# Patient Record
Sex: FEMALE | Race: BLACK OR AFRICAN AMERICAN | NOT HISPANIC OR LATINO | Employment: FULL TIME | URBAN - METROPOLITAN AREA
[De-identification: names, ages, dates, MRNs, and addresses within clinical notes are randomized per-mention and may not be internally consistent; named-entity substitution may affect disease eponyms.]

---

## 2017-03-30 ENCOUNTER — NON-PROVIDER VISIT (OUTPATIENT)
Dept: OCCUPATIONAL MEDICINE | Facility: CLINIC | Age: 37
End: 2017-03-30

## 2017-03-30 DIAGNOSIS — Z02.1 PRE-EMPLOYMENT DRUG SCREENING: ICD-10-CM

## 2017-03-30 LAB
AMP AMPHETAMINE: NORMAL
COC COCAINE: NORMAL
INT CON NEG: NORMAL
INT CON POS: NORMAL
MET METHAMPHETAMINES: NORMAL
OPI OPIATES: NORMAL
PCP PHENCYCLIDINE: NORMAL
POC DRUG COMMENT 753798-OCCUPATIONAL HEALTH: NORMAL
THC: NORMAL

## 2017-03-30 PROCEDURE — 80305 DRUG TEST PRSMV DIR OPT OBS: CPT | Performed by: PREVENTIVE MEDICINE

## 2017-03-30 NOTE — MR AVS SNAPSHOT
Elizabeth Beck   3/30/2017 1:00 PM   Non-Provider Visit   MRN: [id number]    Department:  [facility]   Dept Phone:  [phone]    Description:  Female : 1980   Provider:  [facility] WILLEM MOLINA, R.N.           Reason for Visit     Drug / Alcohol Assessment d/s pre employment [employer]       Allergies as of 3/30/2017     Allergen Noted Reactions    Nkda [No Known Drug Allergy] 2009         You were diagnosed with     Pre-employment drug screening   [841356]         Vital Signs     Smoking Status                   Current Every Day Smoker           Basic Information     Date Of Birth Sex Race Ethnicity Preferred Language    1980 Female Black or  Non- English      Your appointments     Mar 30, 2017  1:00 PM   Occupational Health Drug and Alcohol Testing with [place] WILLEM MCCLAIN MA   [facility] Occupational Health [facility]    [address] [place] [address]  [address]  [facility] [address]   [phone]              Health Maintenance     Patient has no pending health maintenance at this time      Results     POCT 6 Panel Urine Drug Screen      Component    AMPHETAMINE    POC THC    COCAINE    OPIATES    PHENCYCLIDINE    METHAMPHETAMINES    POC Urine Drug Screen Comment    NEG    Internal Control Positive    Valid    Internal Control Negative    Valid                        Current Immunizations     Tdap Vaccine 2016  5:38 AM      Below and/or attached are the medications your provider expects you to take. Review all of your home medications and newly ordered medications with your provider and/or pharmacist. Follow medication instructions as directed by your provider and/or pharmacist. Please keep your medication list with you and share with your provider. Update the information when medications are discontinued, doses are changed, or new medications (including over-the-counter products) are added; and carry medication information at all times in the event of emergency  situations     Allergies:  NKDA - (reactions not documented)               Medications  Valid as of: March 30, 2017 - 12:42 PM    Generic Name Brand Name Tablet Size Instructions for use    Hydrocodone-Acetaminophen (Tab) NORCO 5-325 MG Take 1-2 Tabs by mouth every 6 hours as needed.        .                 Medicines prescribed today were sent to:     None      Medication refill instructions:       If your prescription bottle indicates you have medication refills left, it is not necessary to call your provider’s office. Please contact your pharmacy and they will refill your medication.    If your prescription bottle indicates you do not have any refills left, you may request refills at any time through one of the following ways: The online [facility] system (except Urgent Care), by calling your provider’s office, or by asking your pharmacy to contact your provider’s office with a refill request. Medication refills are processed only during regular business hours and may not be available until the next business day. Your provider may request additional information or to have a follow-up visit with you prior to refilling your medication.   *Please Note: Medication refills are assigned a new Rx number when refilled electronically. Your pharmacy may indicate that no refills were authorized even though a new prescription for the same medication is available at the pharmacy. Please request the medicine by name with the pharmacy before contacting your provider for a refill.           MyChart Status: Patient Declined        Quit Tobacco Information     Do you want to quit using tobacco?    Quitting tobacco decreases risks of cancer, heart and lung disease, increases life expectancy, improves sense of taste and smell, and increases spending money, among other benefits.    If you are thinking about quitting, we can help.  • [facility] Quit Tobacco Program: 924.416.6351  o Program occurs weekly for four weeks and includes pharmacist  consultation on products to support quitting smoking or chewing tobacco. A provider referral is needed for pharmacist consultation.  • Tobacco Users Help Hotline: 2-800QUIT-NOW (909-9231) or https://[place].quitlogix.org/  o Free, confidential telephone and online coaching for [place] residents. Sessions are designed on a schedule that is convenient for you. Eligible clients receive free nicotine replacement therapy.  • Nationally: www.smokefree.gov  o Information and professional assistance to support both immediate and long-term needs as you become, and remain, a non-smoker. Smokefree.gov allows you to choose the help that best fits your needs.

## 2017-06-03 ENCOUNTER — HOSPITAL ENCOUNTER (EMERGENCY)
Facility: MEDICAL CENTER | Age: 37
End: 2017-06-03
Attending: EMERGENCY MEDICINE
Payer: MEDICAID

## 2017-06-03 VITALS
SYSTOLIC BLOOD PRESSURE: 132 MMHG | BODY MASS INDEX: 24.01 KG/M2 | RESPIRATION RATE: 16 BRPM | HEIGHT: 67 IN | HEART RATE: 76 BPM | OXYGEN SATURATION: 98 % | TEMPERATURE: 98.8 F | DIASTOLIC BLOOD PRESSURE: 74 MMHG | WEIGHT: 153 LBS

## 2017-06-03 DIAGNOSIS — G89.29 CHRONIC HAND PAIN, RIGHT: ICD-10-CM

## 2017-06-03 DIAGNOSIS — M79.641 CHRONIC HAND PAIN, RIGHT: ICD-10-CM

## 2017-06-03 PROCEDURE — 99283 EMERGENCY DEPT VISIT LOW MDM: CPT

## 2017-06-03 ASSESSMENT — PAIN SCALES - GENERAL: PAINLEVEL_OUTOF10: 7

## 2017-06-03 NOTE — ED AVS SNAPSHOT
[employer] Access Code: Activation code not generated  Current [employer] Status: Patient Declined    Your email address is not on file at "[employer].  Email Addresses are required for you to sign up for [employer], please contact [phone] to verify your personal information and to provide your email address prior to attempting to register for [employer].    Elizabeth Beck  [facility] [place] [address]    [employer]  A secure, online tool to manage your health information     "[employer]’s [employer]® is a secure, online tool that connects you to your personalized health information from the privacy of your home -- day or night - making it very easy for you to manage your healthcare. Once the activation process is completed, you can even access your medical information using the [employer] gina, which is available for free in the [employer] Gina store or Google Play store.     To learn more about [employer], visit [email or web address]    There are two levels of access available (as shown below):   My Chart Features  [facility] Primary Care Doctor [facility]  Specialists [facility]  Urgent  Care Non-[facility] Primary Care Doctor   Email your healthcare team securely and privately 24/7 X X X    Manage appointments: schedule your next appointment; view details of past/upcoming appointments X      Request prescription refills. X      View recent personal medical records, including lab and immunizations X X X X   View health record, including health history, allergies, medications X X X X   Read reports about your outpatient visits, procedures, consult and ER notes X X X X   See your discharge summary, which is a recap of your hospital and/or ER visit that includes your diagnosis, lab results, and care plan X X  X     How to register for [employer]:  Once your e-mail address has been verified, follow the following steps to sign up for [employer].     1. Go to  [email or web address]  2. Click on the Sign Up Now box, which takes you to the  New Member Sign Up page. You will need to provide the following information:  a. Enter your [employer] Access Code exactly as it appears at the top of this page. (You will not need to use this code after you’ve completed the sign-up process. If you do not sign up before the expiration date, you must request a new code.)   b. Enter your date of birth.   c. Enter your home email address.   d. Click Submit, and follow the next screen’s instructions.  3. Create a [employer] ID. This will be your [employer] login ID and cannot be changed, so think of one that is secure and easy to remember.  4. Create a [employer] password. You can change your password at any time.  5. Enter your Password Reset Question and Answer. This can be used at a later time if you forget your password.   6. Enter your e-mail address. This allows you to receive e-mail notifications when new information is available in [employer].  7. Click Sign Up. You can now view your health information.    For assistance activating your [employer] account, call [phone]

## 2017-06-03 NOTE — DISCHARGE INSTRUCTIONS
Chronic Pain  Chronic pain can be defined as pain that is off and on and lasts for 3-6 months or longer. Many things cause chronic pain, which can make it difficult to make a diagnosis. There are many treatment options available for chronic pain. However, finding a treatment that works well for you may require trying various approaches until the right one is found. Many people benefit from a combination of two or more types of treatment to control their pain.  SYMPTOMS   Chronic pain can occur anywhere in the body and can range from mild to very severe. Some types of chronic pain include:  · Headache.  · Low back pain.  · Cancer pain.  · Arthritis pain.  · Neurogenic pain. This is pain resulting from damage to nerves.   People with chronic pain may also have other symptoms such as:  · Depression.  · Anger.  · Insomnia.  · Anxiety.  DIAGNOSIS   Your health care provider will help diagnose your condition over time. In many cases, the initial focus will be on excluding possible conditions that could be causing the pain. Depending on your symptoms, your health care provider may order tests to diagnose your condition. Some of these tests may include:   · Blood tests.    · CT scan.    · MRI.    · X-rays.    · Ultrasounds.    · Nerve conduction studies.    You may need to see a specialist.   TREATMENT   Finding treatment that works well may take time. You may be referred to a pain specialist. He or she may prescribe medicine or therapies, such as:   · Mindful meditation or yoga.  · Shots (injections) of numbing or pain-relieving medicines into the spine or area of pain.  · Local electrical stimulation.  · Acupuncture.    · Massage therapy.    · Aroma, color, light, or sound therapy.    · Biofeedback.    · Working with a physical therapist to keep from getting stiff.    · Regular, gentle exercise.    · Cognitive or behavioral therapy.    · Group support.    Sometimes, surgery may be recommended.   HOME CARE INSTRUCTIONS    · Take all medicines as directed by your health care provider.    · Lessen stress in your life by relaxing and doing things such as listening to calming music.    · Exercise or be active as directed by your health care provider.    · Eat a healthy diet and include things such as vegetables, fruits, fish, and lean meats in your diet.    · Keep all follow-up appointments with your health care provider.    · Attend a support group with others suffering from chronic pain.  SEEK MEDICAL CARE IF:   · Your pain gets worse.    · You develop a new pain that was not there before.    · You cannot tolerate medicines given to you by your health care provider.    · You have new symptoms since your last visit with your health care provider.    SEEK IMMEDIATE MEDICAL CARE IF:   · You feel weak.    · You have decreased sensation or numbness.    · You lose control of bowel or bladder function.    · Your pain suddenly gets much worse.    · You develop shaking.  · You develop chills.  · You develop confusion.  · You develop chest pain.  · You develop shortness of breath.    MAKE SURE YOU:  · Understand these instructions.  · Will watch your condition.  · Will get help right away if you are not doing well or get worse.     This information is not intended to replace advice given to you by your health care provider. Make sure you discuss any questions you have with your health care provider.     Document Released: 09/09/2003 Document Revised: 08/20/2014 Document Reviewed: 06/13/2014  [employer] Interactive Patient Education ©2016 [employer]

## 2017-06-03 NOTE — ED PROVIDER NOTES
ED Provider Note    Scribed for Brando Perez M.D. by Lynnette Toro. 6/3/2017, 1:46 PM.    Primary care provider: Pcp Pt States None  Means of arrival: Walk-In  History obtained from: Patient  History limited by: None    CHIEF COMPLAINT  Chief Complaint   Patient presents with   • Hand Pain     amb to triage, states  R hand injury from 2 yrs ago. Had MRI last week shows muscle tear.  Pt has brace on wrist and is in sling.  Reports increased pain in wrist and hand rad to elbow. unable to use hand for work.       HPI  Elizabeth Beck is a 36 y.o. female who presents to the Emergency Department for right hand pain. She describes her hand pain as throbbing in nature which she states radiates up her arm. Patient confirms having an MRI done last week which indicated a muscle tear. Per patient, she has been able to use her hand with this pain but during the last 3 days has been unable to do so. She has been taking Percocet for her pain. Confirms past hand injury to the same hand 2 years ago.     REVIEW OF SYSTEMS  Pertinent positives include hand pain, hand throbbing. Pertinent negatives include no swelling. As above, all other systems reviewed and are negative.   See HPI for further details.     PAST MEDICAL HISTORY   has a past medical history of Migraine and Asthma.    SURGICAL HISTORY   has past surgical history that includes dilation and curettage.    SOCIAL HISTORY  Social History   Substance Use Topics   • Smoking status: Current Every Day Smoker -- 0.50 packs/day     Types: Cigarettes   • Smokeless tobacco: None   • Alcohol Use: No      History   Drug Use No       FAMILY HISTORY  No family history on file.    CURRENT MEDICATIONS  Home Medications     **Home medications have not yet been reviewed for this encounter**          ALLERGIES  Allergies   Allergen Reactions   • Nkda [No Known Drug Allergy]        PHYSICAL EXAM  VITAL SIGNS: /79 mmHg  Pulse 80  Temp(Src) 37.1 °C (98.8 °F)  Resp 14  " Ht 1.702 m (5' 7\")  Wt 69.4 kg (153 lb)  BMI 23.96 kg/m2  SpO2 98%  Vitals reviewed.    Constitutional: Alert in no apparent distress.  HENT: No signs of trauma, Bilateral external ears normal, Nose normal.   Eyes: Pupils are equal and reactive, Conjunctiva normal, Non-icteric.   Neck: Normal range of motion, No tenderness, Supple, No stridor.   Lymphatic: No lymphadenopathy noted.    Skin: Warm, Dry, No erythema, No rash.   Back: No bony tenderness, No CVA tenderness.   Extremities: Intact distal pulses, No edema, No tenderness, No cyanosis  Musculoskeletal: Tenderness over the thumb and radius. No erythema. No evidence of infection. Good range of motion in all major joints. No major deformities noted.   Neurologic: Alert , Normal motor function, Normal sensory function, No focal deficits noted.   Psychiatric: Affect normal, Judgment normal, Mood normal.     DIAGNOSTIC STUDIES / PROCEDURES    COURSE & MEDICAL DECISION MAKING  Nursing notes, VS, PMSFHx reviewed in chart.    Obtained and reviewed past medical records which indicate the patient was last seen in the ED December 14, 2016 for a close head injury.     1:42 PM - Looked the patient up in the prescription monitoring program which shows she has received 60 tablets of oxycodone per month from Dr. Luciano for this chronic pain.     1:46 PM - Patient seen and examined at bedside. Patient is referred back to Dr. Luciano. I explained to her why I am unable to prescribe any narcotics for her safety.     The patient will return for new or worsening symptoms and is stable at the time of discharge.    The patient is referred to a primary physician for blood pressure management, diabetic screening, and for all other preventative health concerns.    The patient will not drink alcohol nor drive with prescribed medications. The patient will return for worsening symptoms and is stable at the time of discharge. The patient verbalizes understanding and will " comply.    DISPOSITION:  Patient will be discharged home in stable condition.    FOLLOW UP:  [facility], Emergency Dept  [address]  [place] [address] [phone]    If symptoms worsen    Alexander Luciano M.D.  [address]   [place] [id number] [phone]      call for follow up      OUTPATIENT MEDICATIONS:  New Prescriptions    No medications on file           FINAL IMPRESSION  1. Chronic hand pain, right          ILynnette (Luis), am scribing for, and in the presence of, Brando Perez M.D..    Electronically signed by: Lynnette Toro (Luis), 6/3/2017    Brando STEIN M.D. personally performed the services described in this documentation, as scribed by Lynnette Toro in my presence, and it is both accurate and complete.    The note accurately reflects work and decisions made by me.  Brando Perez  6/3/2017  2:10 PM

## 2017-06-03 NOTE — ED NOTES
Discharge orders received, instructions and education given, follow-up discussed, pt verbalized understanding.

## 2017-06-03 NOTE — ED AVS SNAPSHOT
6/3/2017    Elizabeth Beck  [address]  [place] [address]    Dear Elizabeth:    [facility] wants to ensure your discharge home is safe and you or your loved ones have had all of your questions answered regarding your care after you leave the hospital.    Below is a list of resources and contact information should you have any questions regarding your hospital stay, follow-up instructions, or active medical symptoms.    Questions or Concerns Regarding… Contact   Medical Questions Related to Your Discharge  (7 days a week, 8am-5pm) Contact a Nurse Care Coordinator   [phone]   Medical Questions Not Related to Your Discharge  (24 hours a day / 7 days a week)  Contact the Nurse Health Line   [phone]    Medications or Discharge Instructions Refer to your discharge packet   or contact your [facility] Primary Care Provider   [phone]   Follow-up Appointment(s) Schedule your appointment via [employer]   or contact Scheduling [phone]   Billing Review your statement via [employer]  or contact Billing [phone]   Medical Records Review your records via [employer]   or contact Medical Records [phone]     You may receive a telephone call within two days of discharge. This call is to make certain you understand your discharge instructions and have the opportunity to have any questions answered. You can also easily access your medical information, test results and upcoming appointments via the [employer] free online health management tool. You can learn more and sign up at [facility]/[employer]go. For assistance setting up your [employer] account, please call [phone].    Once again, we want to ensure your discharge home is safe and that you have a clear understanding of any next steps in your care. If you have any questions or concerns, please do not hesitate to contact us, we are here for you. Thank you for choosing [facility] for your healthcare needs.    Sincerely,    Your [facility] Healthcare Team

## 2017-06-03 NOTE — ED AVS SNAPSHOT
Home Care Instructions                                                                                                                Elizabeth Beck   MRN: [id number]    Department:  [facility], Emergency Dept   Date of Visit:  6/3/2017            [facility], Emergency Dept    [address] [facility] [address]    Phone:  [phone]      You were seen by     Brando Perez M.D.      Your Diagnosis Was     Chronic hand pain, right     M79.641, G89.29       Follow-up Information     1. Follow up with [facility], Emergency Dept.    Specialty:  Emergency Medicine    Why:  If symptoms worsen    Contact information    [address] [facility] [address] [phone]        2. Follow up with Alexander Luciano M.D..    Specialty:  Family Medicine    Why:  call for follow up    Contact information    [address] [facility] [address] [phone]        Medication Information     Review all of your home medications and newly ordered medications with your primary doctor and/or pharmacist as soon as possible. Follow medication instructions as directed by your doctor and/or pharmacist.     Please keep your complete medication list with you and share with your physician. Update the information when medications are discontinued, doses are changed, or new medications (including over-the-counter products) are added; and carry medication information at all times in the event of emergency situations.               Medication List      Notice     You have not been prescribed any medications.              Discharge Instructions       Chronic Pain  Chronic pain can be defined as pain that is off and on and lasts for 3-6 months or longer. Many things cause chronic pain, which can make it difficult to make a diagnosis. There are many treatment options available for chronic pain. However, finding a treatment that works well for you may require trying  various approaches until the right one is found. Many people benefit from a combination of two or more types of treatment to control their pain.  SYMPTOMS   Chronic pain can occur anywhere in the body and can range from mild to very severe. Some types of chronic pain include:  · Headache.  · Low back pain.  · Cancer pain.  · Arthritis pain.  · Neurogenic pain. This is pain resulting from damage to nerves.   People with chronic pain may also have other symptoms such as:  · Depression.  · Anger.  · Insomnia.  · Anxiety.  DIAGNOSIS   Your health care provider will help diagnose your condition over time. In many cases, the initial focus will be on excluding possible conditions that could be causing the pain. Depending on your symptoms, your health care provider may order tests to diagnose your condition. Some of these tests may include:   · Blood tests.    · CT scan.    · MRI.    · X-rays.    · Ultrasounds.    · Nerve conduction studies.    You may need to see a specialist.   TREATMENT   Finding treatment that works well may take time. You may be referred to a pain specialist. He or she may prescribe medicine or therapies, such as:   · Mindful meditation or yoga.  · Shots (injections) of numbing or pain-relieving medicines into the spine or area of pain.  · Local electrical stimulation.  · Acupuncture.    · Massage therapy.    · Aroma, color, light, or sound therapy.    · Biofeedback.    · Working with a physical therapist to keep from getting stiff.    · Regular, gentle exercise.    · Cognitive or behavioral therapy.    · Group support.    Sometimes, surgery may be recommended.   HOME CARE INSTRUCTIONS   · Take all medicines as directed by your health care provider.    · Lessen stress in your life by relaxing and doing things such as listening to calming music.    · Exercise or be active as directed by your health care provider.    · Eat a healthy diet and include things such as vegetables, fruits, fish, and lean meats  in your diet.    · Keep all follow-up appointments with your health care provider.    · Attend a support group with others suffering from chronic pain.  SEEK MEDICAL CARE IF:   · Your pain gets worse.    · You develop a new pain that was not there before.    · You cannot tolerate medicines given to you by your health care provider.    · You have new symptoms since your last visit with your health care provider.    SEEK IMMEDIATE MEDICAL CARE IF:   · You feel weak.    · You have decreased sensation or numbness.    · You lose control of bowel or bladder function.    · Your pain suddenly gets much worse.    · You develop shaking.  · You develop chills.  · You develop confusion.  · You develop chest pain.  · You develop shortness of breath.    MAKE SURE YOU:  · Understand these instructions.  · Will watch your condition.  · Will get help right away if you are not doing well or get worse.     This information is not intended to replace advice given to you by your health care provider. Make sure you discuss any questions you have with your health care provider.     Document Released: 09/09/2003 Document Revised: 08/20/2014 Document Reviewed: 06/13/2014  [employer] Patient Education ©2016 [employer]            Patient Information     Patient Information    Following emergency treatment: all patient requiring follow-up care must return either to a private physician or a clinic if your condition worsens before you are able to obtain further medical attention, please return to the emergency room.     Billing Information    At [facility], we work to make the billing process streamlined for our patients.  Our Representatives are here to answer any questions you may have regarding your hospital bill.  If you have insurance coverage and have supplied your insurance information to us, we will submit a claim to your insurer on your behalf.  Should you have any questions regarding your bill, we can be reached online  or by phone as follows:  Online: You are able pay your bills online or live chat with our representatives about any billing questions you may have. We are here to help Monday - Friday from 8:00am to 7:30pm and 9:00am - 12:00pm on Saturdays.  Please visit [email or web address]  for more information.   Phone:  [phone] or [phone]    Please note that your emergency physician, surgeon, pathologist, radiologist, anesthesiologist, and other specialists are not employed by [facility] and will therefore bill separately for their services.  Please contact them directly for any questions concerning their bills at the numbers below:     Emergency Physician Services:  [phone]  [facility]:  [phone]  [facility]:  [phone]  [facility]:  [phone]    1. Your final bill may vary from the amount quoted upon discharge if all procedures are not complete at that time, or if your doctor has additional procedures of which we are not aware. You will receive an additional bill if you return to the Emergency Department at [facility] for suture removal regardless of the facility of which the sutures were placed.     2. Please arrange for settlement of this account at the emergency registration.    3. All self-pay accounts are due in full at the time of treatment.  If you are unable to meet this obligation then payment is expected within 4-5 days.     4. If you have had radiology studies (CT, X-ray, Ultrasound, MRI), you have received a preliminary result during your emergency department visit. Please contact the radiology department [phone] to receive a copy of your final result. Please discuss the Final result with your primary physician or with the follow up physician provided.     Crisis Hotline:  [place] Crisis Hotline:  4-740-VWCRFFE or 1-787.888.1993  [place] Crisis Hotline:    1-234.235.6916 or 349-991-3325         ED  Discharge Follow Up Questions    1. In order to provide you with very good care, we would like to follow up with a phone call in the next few days.  May we have your permission to contact you?     YES /  NO    2. What is the best phone number to call you? (       )_____-__________    3. What is the best time to call you?      Morning  /  Afternoon  /  Evening                   Patient Signature:  ____________________________________________________________    Date:  ____________________________________________________________

## 2017-06-03 NOTE — ED NOTES
Chief Complaint   Patient presents with   • Hand Pain     amb to triage, states  R hand injury from 2 yrs ago. Had MRI last week shows muscle tear.  Pt has brace on wrist and is in sling.  Reports increased pain in wrist and hand rad to elbow. unable to use hand for work.   returned to [facility] waiting for FT room.